# Patient Record
Sex: FEMALE | Race: WHITE | NOT HISPANIC OR LATINO | Employment: UNEMPLOYED | URBAN - METROPOLITAN AREA
[De-identification: names, ages, dates, MRNs, and addresses within clinical notes are randomized per-mention and may not be internally consistent; named-entity substitution may affect disease eponyms.]

---

## 2024-05-11 ENCOUNTER — HOSPITAL ENCOUNTER (EMERGENCY)
Facility: HOSPITAL | Age: 64
Discharge: HOME | End: 2024-05-11
Attending: EMERGENCY MEDICINE
Payer: MEDICARE

## 2024-05-11 VITALS
RESPIRATION RATE: 20 BRPM | TEMPERATURE: 99 F | DIASTOLIC BLOOD PRESSURE: 85 MMHG | SYSTOLIC BLOOD PRESSURE: 163 MMHG | WEIGHT: 163 LBS | HEART RATE: 75 BPM | OXYGEN SATURATION: 98 %

## 2024-05-11 DIAGNOSIS — S09.90XA INJURY OF HEAD, INITIAL ENCOUNTER: ICD-10-CM

## 2024-05-11 DIAGNOSIS — S01.01XA LACERATION OF SCALP WITHOUT FOREIGN BODY, INITIAL ENCOUNTER: ICD-10-CM
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
## 2024-05-11 DIAGNOSIS — S00.93XA TRAUMATIC CEPHALOHEMATOMA, INITIAL ENCOUNTER: ICD-10-CM

## 2024-05-11 PROCEDURE — 12002 RPR S/N/AX/GEN/TRNK2.6-7.5CM: CPT | Mod: ER

## 2024-05-11 PROCEDURE — 99284 EMERGENCY DEPT VISIT MOD MDM: CPT | Mod: 25,ER

## 2024-05-11 RX ORDER — ACETAMINOPHEN 500 MG
500 TABLET ORAL EVERY 6 HOURS PRN
Qty: 30 TABLET | Refills: 0 | Status: SHIPPED | OUTPATIENT
Start: 2024-05-11

## 2024-05-11 NOTE — DISCHARGE INSTRUCTIONS

## 2024-05-11 NOTE — ED PROVIDER NOTES
Encounter Date: 5/11/2024    SCRIBE #1 NOTE: JAY COLINDRES am scribing for, and in the presence of,  Alayna Holdsworth, PA-C. I have scribed the following portions of the note - Other sections scribed: HPI, ROS.       History     Chief Complaint   Patient presents with    Fall     Pt here with daughter.   Per pt, pt fell striking head on ground   +dry blood noted to hair       Rosalindarut Menjivar is a 63 y.o. female, with no pertinent PMHx, who presents to the ED with wound to posterior right head which occurred ~1 hr ago after a fall. Patient reports she tripped on her wedges and fell to the ground. She endorses head trauma but denies any LOC. She denies the use of blood thinners.  Patient is complaining of a burning pain from the wound.  She did not take any medicine PTA. No other exacerbating or alleviating factors. She denies any nausea, vomiting, diarrhea, chest pain, shortness of breath, dizziness, lightheadedness, visual disturbances, or other associated symptoms. Pt states tetanus is up-to-date.     The history is provided by the patient. No  was used.     Review of patient's allergies indicates:  No Known Allergies  History reviewed. No pertinent past medical history.  History reviewed. No pertinent surgical history.  No family history on file.     Review of Systems   Eyes:  Negative for visual disturbance.   Respiratory:  Negative for shortness of breath.    Cardiovascular:  Negative for chest pain.   Gastrointestinal:  Negative for nausea and vomiting.   Musculoskeletal:  Negative for neck pain.   Skin:  Positive for wound (Head laceration).   Neurological:  Negative for dizziness, syncope, weakness, light-headedness, numbness and headaches.       Physical Exam     Initial Vitals [05/11/24 1616]   BP Pulse Resp Temp SpO2   (!) 168/81 79 20 98.7 °F (37.1 °C) 98 %      MAP       --         Physical Exam    Nursing note and vitals reviewed.  Constitutional: She appears well-developed and  well-nourished. She is not diaphoretic. She is active. She does not appear ill. No distress.   HENT:   Head: Normocephalic. Head is with contusion and with laceration (1 cm superifical lac to right temporal head). Head is without raccoon's eyes and without Garcia's sign.       Right Ear: External ear normal.   Left Ear: External ear normal.   Nose: Nose normal.   Right temporal 2 cm hematoma and contusion with 1 cm superficial lac; no active bleeding   Eyes: Conjunctivae, EOM and lids are normal. Pupils are equal, round, and reactive to light. Right eye exhibits no discharge. Left eye exhibits no discharge. Right eye exhibits normal extraocular motion and no nystagmus. Left eye exhibits normal extraocular motion and no nystagmus.   Neck: Phonation normal. Neck supple.   Normal range of motion.   Full passive range of motion without pain.     Cardiovascular:  Normal rate and regular rhythm.           Pulmonary/Chest: Effort normal and breath sounds normal. No respiratory distress.   Abdominal: She exhibits no distension.   Musculoskeletal:         General: Normal range of motion.      Cervical back: Full passive range of motion without pain, normal range of motion and neck supple. No spinous process tenderness or muscular tenderness.     Neurological: She is alert and oriented to person, place, and time. She has normal strength. No cranial nerve deficit or sensory deficit. GCS eye subscore is 4. GCS verbal subscore is 5. GCS motor subscore is 6.   Skin: Skin is dry. Capillary refill takes less than 2 seconds.         ED Course   Lac Repair    Date/Time: 5/11/2024 5:14 PM    Performed by: Holdsworth, Alayna, PA-C  Authorized by: Holdsworth, Alayna, PA-C    Consent:     Consent obtained:  Verbal    Consent given by:  Patient    Risks discussed:  Infection, pain, poor cosmetic result and poor wound healing  Universal protocol:     Patient identity confirmed:  Verbally with patient  Anesthesia:     Anesthesia method:   None  Laceration details:     Location:  Scalp    Scalp location:  R temporal    Length (cm):  1  Exploration:     Hemostasis achieved with:  Direct pressure    Imaging obtained comment:  CT    Imaging outcome: foreign body noted    Treatment:     Area cleansed with:  Saline    Amount of cleaning:  Standard    Irrigation solution:  Sterile saline    Irrigation method:  Pressure wash    Debridement:  None    Undermining:  None  Skin repair:     Repair method:  Tissue adhesive  Approximation:     Approximation:  Close  Repair type:     Repair type:  Simple  Post-procedure details:     Dressing:  Open (no dressing)    Procedure completion:  Tolerated well, no immediate complications    Labs Reviewed - No data to display       Imaging Results              CT Cervical Spine Without Contrast (Final result)  Result time 05/11/24 17:14:58      Final result by Cynthia Pozo MD (05/11/24 17:14:58)                   Impression:      No acute intracranial abnormality detected. Small right parietal cephalohematoma.    No acute cervical fracture.  Spondylitic changes.      Electronically signed by: Cynthia Pozo  Date:    05/11/2024  Time:    17:14               Narrative:    EXAMINATION:  CT OF THE HEAD WITHOUT AND CT CERVICAL SPINE    CLINICAL HISTORY:  Status post fall hitting head on the ground.  Dried blood in the hair.    TECHNIQUE:  5 mm unenhanced axial images were obtained from the skull base to the vertex.  1.25 mm axial images were obtained through the cervical spine.    COMPARISON:  None.    FINDINGS:  CT head: Small right parietal cephalohematoma is present.  The ventricles, basal cisterns, and cortical sulci are within normal limits for patient's stated age. There is no acute intracranial hemorrhage, territorial infarct or mass effect, or midline shift. The visualized paranasal sinuses and mastoid air cells are clear. There is hyperostosis frontalis.    CT cervical spine: There is mild reversal of the  normal cervical lordosis..  There is no acute fracture or subluxation.  Degenerative changes are seen at the lower cervical spine and the left facets.  The bones are normally mineralized.                                       CT Head Without Contrast (Final result)  Result time 05/11/24 17:14:58      Final result by Cynthia Pozo MD (05/11/24 17:14:58)                   Impression:      No acute intracranial abnormality detected. Small right parietal cephalohematoma.    No acute cervical fracture.  Spondylitic changes.      Electronically signed by: Cynthia Pozo  Date:    05/11/2024  Time:    17:14               Narrative:    EXAMINATION:  CT OF THE HEAD WITHOUT AND CT CERVICAL SPINE    CLINICAL HISTORY:  Status post fall hitting head on the ground.  Dried blood in the hair.    TECHNIQUE:  5 mm unenhanced axial images were obtained from the skull base to the vertex.  1.25 mm axial images were obtained through the cervical spine.    COMPARISON:  None.    FINDINGS:  CT head: Small right parietal cephalohematoma is present.  The ventricles, basal cisterns, and cortical sulci are within normal limits for patient's stated age. There is no acute intracranial hemorrhage, territorial infarct or mass effect, or midline shift. The visualized paranasal sinuses and mastoid air cells are clear. There is hyperostosis frontalis.    CT cervical spine: There is mild reversal of the normal cervical lordosis..  There is no acute fracture or subluxation.  Degenerative changes are seen at the lower cervical spine and the left facets.  The bones are normally mineralized.                                       Medications - No data to display  Medical Decision Making  63 y.o. female, with no pertinent PMHx, who presents to the ED with wound to posterior right head which occurred ~1 hr ago after a fall.  Patient's chart and medical history reviewed.    Ddx:  SAH  Epidural hematoma  Subdural  hematoma  Hematoma  Laceration  Contusion    Patient's vitals reviewed.  Afebrile, no respiratory distress, and nontoxic-appearing in the ED. patient's neuro exam was normal. Right temporal 2 cm hematoma and contusion with 1 cm superficial lac; no active bleeding.  Patient denied pain medication.  Patient states tetanus is up-to-date.  CT head and c-spine showed no acute intracranial abnormality detected. Small right parietal cephalohematoma. No acute cervical fracture. Spondylitic changes.  Wound was cleaned and irrigated by nursing staff Laceration repaired with Dermabond.  Patient tolerated well.  Likely a contusion and hematoma from her fall which will take time to heal.  Instructed patient to rest, ice, and stay well hydrated, she verbalized understanding.  Patient be sent home with Tylenol as needed for pain.  Instructed patient to keep the area clean and dry and watch out for signs of infection including erythema, warmth, pus discharge, and fevers at home. Patient agrees with this plan. Discussed with her strict return precautions, she verbalized understanding. Patient is stable for discharge.     Amount and/or Complexity of Data Reviewed  External Data Reviewed: labs and notes.  Radiology: ordered.            Scribe Attestation:   Scribe #1: I performed the above scribed service and the documentation accurately describes the services I performed. I attest to the accuracy of the note.                             I, Alayna Holdsworth,PA-C, personally performed the services described in this documentation.  All medical record entries made by the scribe were at my direction and in my presence.  I have reviewed the chart and agree that the record reflects my personal performance and is accurate and complete.    Clinical Impression:  Final diagnoses:  [W19.XXXA] Fall, initial encounter (Primary)  [S09.90XA] Injury of head, initial encounter  [S00.93XA] Traumatic cephalohematoma, initial encounter  [S01.01XA]  Laceration of scalp without foreign body, initial encounter          ED Disposition Condition    Discharge Stable          ED Prescriptions       Medication Sig Dispense Start Date End Date Auth. Provider    acetaminophen (TYLENOL) 500 MG tablet Take 1 tablet (500 mg total) by mouth every 6 (six) hours as needed for Pain. 30 tablet 5/11/2024 -- Holdsworth, Alayna, PA-C          Follow-up Information       Follow up With Specialties Details Why Contact [facility] ED Emergency Medicine  If symptoms worsen [address] [facility] [id number] [phone]             Holdsworth, Alayna, PA-C  05/11/24 1974